# Patient Record
Sex: FEMALE | Race: WHITE | URBAN - METROPOLITAN AREA
[De-identification: names, ages, dates, MRNs, and addresses within clinical notes are randomized per-mention and may not be internally consistent; named-entity substitution may affect disease eponyms.]

---

## 2022-10-06 ENCOUNTER — OFFICE VISIT (OUTPATIENT)
Dept: OBGYN CLINIC | Age: 62
End: 2022-10-06
Payer: COMMERCIAL

## 2022-10-06 VITALS
BODY MASS INDEX: 30.05 KG/M2 | DIASTOLIC BLOOD PRESSURE: 82 MMHG | HEIGHT: 64 IN | SYSTOLIC BLOOD PRESSURE: 131 MMHG | WEIGHT: 176 LBS

## 2022-10-06 DIAGNOSIS — Z01.419 WELL WOMAN EXAM: Primary | ICD-10-CM

## 2022-10-06 DIAGNOSIS — Z13.89 SCREENING FOR GENITOURINARY CONDITION: ICD-10-CM

## 2022-10-06 LAB
BILIRUBIN, URINE, POC: NEGATIVE
BLOOD URINE, POC: NEGATIVE
GLUCOSE URINE, POC: NEGATIVE
KETONES, URINE, POC: NEGATIVE
LEUKOCYTE ESTERASE, URINE, POC: NEGATIVE
NITRITE, URINE, POC: NEGATIVE
PH, URINE, POC: 9 (ref 4.6–8)
PROTEIN,URINE, POC: NEGATIVE
SPECIFIC GRAVITY, URINE, POC: 1.02 (ref 1–1.03)
URINALYSIS CLARITY, POC: CLEAR
URINALYSIS COLOR, POC: YELLOW
UROBILINOGEN, POC: ABNORMAL

## 2022-10-06 PROCEDURE — 81002 URINALYSIS NONAUTO W/O SCOPE: CPT | Performed by: OBSTETRICS & GYNECOLOGY

## 2022-10-06 PROCEDURE — 99396 PREV VISIT EST AGE 40-64: CPT | Performed by: OBSTETRICS & GYNECOLOGY

## 2022-10-06 NOTE — PROGRESS NOTES
10/6/2022    Clyde Jhaveri  1960  Age: 60 y.o. No LMP recorded (lmp unknown). Patient is postmenopausal.    No obstetric history on file. PCP: Amrik Bautista MD  Patient doessee them for regular preventative visits. HPI: No gyn concerns. No flowsheet data found. Allergies, medications, past medical and surgical history, social history, family history all reviewed. Review of Systems  Constitutional:  Denies unexplained weight loss or heat or cold intolerance  ENT: Denies change in vision, change in hearing, frequent headaches  Cardiovascular:  Denies chest pain, swelling in legs or feet, shortness of breath when lying flat  Respiratory:  Denies shortness of breath, cough greater than 2 weeks or coughing up blood  Gastro: Denies diarrhea greater than 2 weeks, rectal bleeding, bloody stools, heartburn, or constipation  :  Denies blood in urine, nocturia, dysuria or incontinence  Breast:  Denies nipple discharge, masses or pain  Skin:  Denies rash greater than 2 weeks, change in moles  Musculoskeletal/Neuro:  Denies joint pain, muscle weakness, seizures, loss of balance or frequent falls  Psych:  Denies frequent crying spells or severe anxiety  Heme:  Denies easy bruising, bleeding gums, frequent nosebleeds or swollen lymph nodes  GYN:  Denies bleeding or spotting, pain with sex, see HPI  Vitals:    10/06/22 1335   BP: 131/82   Weight: 176 lb (79.8 kg)   Height: 5' 4\" (1.626 m)       Body mass index is 30.21 kg/m². Pt in no distress. Alert and oriented x3. Affect bright. Well developed, well nourished. HEENT: normocephalic, atraumatic. Sclerae nonicteric. Neck supple w/o thyromegaly. Trachea midline. Heart: regular rate and rhythm, no murmur or gallop  Lungs: clear to auscultation.  Respiratory effort normal.  Breasts: no masses or axillary lymphadenopathy  Abdomen: soft and nontender, no masses, no organomegaly, no ventral hernia  Pelvic: normal external genitalia, urethral meatus, urethra, vagina and cervix. Bimanual exam w/o masses or tenderness. Bladder nontender. Uterus normal size. Adnexae normal.  Perineum and anus normal. No hemorrhoids. There is no problem list on file for this patient. Orders Placed This Encounter   Procedures    AMB POC URINALYSIS DIP STICK MANUAL W/O MICRO          Pt counseled about routine screening and vaccination recommendations.       Roger Pruitt MD

## 2023-11-14 ENCOUNTER — OFFICE VISIT (OUTPATIENT)
Dept: OBGYN CLINIC | Age: 63
End: 2023-11-14
Payer: COMMERCIAL

## 2023-11-14 VITALS
SYSTOLIC BLOOD PRESSURE: 124 MMHG | HEART RATE: 82 BPM | BODY MASS INDEX: 30.05 KG/M2 | DIASTOLIC BLOOD PRESSURE: 84 MMHG | WEIGHT: 176 LBS | OXYGEN SATURATION: 99 % | HEIGHT: 64 IN

## 2023-11-14 DIAGNOSIS — Z13.89 SCREENING FOR GENITOURINARY CONDITION: ICD-10-CM

## 2023-11-14 DIAGNOSIS — Z01.419 WELL WOMAN EXAM: Primary | ICD-10-CM

## 2023-11-14 LAB
BILIRUBIN, URINE, POC: NEGATIVE
BLOOD URINE, POC: NEGATIVE
GLUCOSE URINE, POC: NEGATIVE
KETONES, URINE, POC: NEGATIVE
LEUKOCYTE ESTERASE, URINE, POC: NEGATIVE
NITRITE, URINE, POC: NEGATIVE
PH, URINE, POC: 6 (ref 4.6–8)
PROTEIN,URINE, POC: NEGATIVE
SPECIFIC GRAVITY, URINE, POC: 1.03 (ref 1–1.03)
URINALYSIS CLARITY, POC: CLEAR
URINALYSIS COLOR, POC: YELLOW
UROBILINOGEN, POC: NORMAL

## 2023-11-14 PROCEDURE — 81002 URINALYSIS NONAUTO W/O SCOPE: CPT | Performed by: OBSTETRICS & GYNECOLOGY

## 2023-11-14 PROCEDURE — 99396 PREV VISIT EST AGE 40-64: CPT | Performed by: OBSTETRICS & GYNECOLOGY

## 2023-11-14 RX ORDER — MULTIVIT-MIN/IRON/FOLIC ACID/K 18-600-40
CAPSULE ORAL
COMMUNITY

## 2023-11-14 SDOH — ECONOMIC STABILITY: HOUSING INSECURITY
IN THE LAST 12 MONTHS, WAS THERE A TIME WHEN YOU DID NOT HAVE A STEADY PLACE TO SLEEP OR SLEPT IN A SHELTER (INCLUDING NOW)?: NO

## 2023-11-14 SDOH — ECONOMIC STABILITY: INCOME INSECURITY: HOW HARD IS IT FOR YOU TO PAY FOR THE VERY BASICS LIKE FOOD, HOUSING, MEDICAL CARE, AND HEATING?: NOT HARD AT ALL

## 2023-11-14 SDOH — ECONOMIC STABILITY: FOOD INSECURITY: WITHIN THE PAST 12 MONTHS, YOU WORRIED THAT YOUR FOOD WOULD RUN OUT BEFORE YOU GOT MONEY TO BUY MORE.: NEVER TRUE

## 2023-11-14 SDOH — ECONOMIC STABILITY: FOOD INSECURITY: WITHIN THE PAST 12 MONTHS, THE FOOD YOU BOUGHT JUST DIDN'T LAST AND YOU DIDN'T HAVE MONEY TO GET MORE.: NEVER TRUE

## 2023-11-14 ASSESSMENT — PATIENT HEALTH QUESTIONNAIRE - PHQ9
1. LITTLE INTEREST OR PLEASURE IN DOING THINGS: 0
SUM OF ALL RESPONSES TO PHQ QUESTIONS 1-9: 0
SUM OF ALL RESPONSES TO PHQ QUESTIONS 1-9: 0
SUM OF ALL RESPONSES TO PHQ9 QUESTIONS 1 & 2: 0
SUM OF ALL RESPONSES TO PHQ QUESTIONS 1-9: 0
SUM OF ALL RESPONSES TO PHQ QUESTIONS 1-9: 0
2. FEELING DOWN, DEPRESSED OR HOPELESS: 0

## 2023-11-19 LAB
COLLECTION METHOD: NORMAL
CYTOLOGIST CVX/VAG CYTO: NORMAL
CYTOLOGY CVX/VAG DOC THIN PREP: NORMAL
HPV APTIMA: NEGATIVE
Lab: NORMAL
PAP SOURCE: NORMAL
PATH REPORT.FINAL DX SPEC: NORMAL
STAT OF ADQ CVX/VAG CYTO-IMP: NORMAL

## 2024-12-04 ENCOUNTER — OFFICE VISIT (OUTPATIENT)
Dept: OBGYN CLINIC | Age: 64
End: 2024-12-04
Payer: COMMERCIAL

## 2024-12-04 VITALS
BODY MASS INDEX: 30.35 KG/M2 | HEIGHT: 64 IN | DIASTOLIC BLOOD PRESSURE: 84 MMHG | SYSTOLIC BLOOD PRESSURE: 136 MMHG | WEIGHT: 177.8 LBS

## 2024-12-04 DIAGNOSIS — Z13.89 SCREENING FOR GENITOURINARY CONDITION: ICD-10-CM

## 2024-12-04 DIAGNOSIS — Z01.419 WELL WOMAN EXAM: Primary | ICD-10-CM

## 2024-12-04 LAB
BILIRUBIN, URINE, POC: NEGATIVE
BLOOD URINE, POC: NEGATIVE
GLUCOSE URINE, POC: NEGATIVE
KETONES, URINE, POC: NEGATIVE
LEUKOCYTE ESTERASE, URINE, POC: NEGATIVE
NITRITE, URINE, POC: NEGATIVE
PH, URINE, POC: 6 (ref 4.6–8)
PROTEIN,URINE, POC: NEGATIVE
SPECIFIC GRAVITY, URINE, POC: 1.02 (ref 1–1.03)
URINALYSIS CLARITY, POC: CLEAR
URINALYSIS COLOR, POC: YELLOW
UROBILINOGEN, POC: NORMAL MG/DL

## 2024-12-04 PROCEDURE — 99397 PER PM REEVAL EST PAT 65+ YR: CPT | Performed by: OBSTETRICS & GYNECOLOGY

## 2024-12-04 PROCEDURE — 81002 URINALYSIS NONAUTO W/O SCOPE: CPT | Performed by: OBSTETRICS & GYNECOLOGY

## 2024-12-04 ASSESSMENT — PATIENT HEALTH QUESTIONNAIRE - PHQ9
SUM OF ALL RESPONSES TO PHQ QUESTIONS 1-9: 0
SUM OF ALL RESPONSES TO PHQ QUESTIONS 1-9: 0
1. LITTLE INTEREST OR PLEASURE IN DOING THINGS: NOT AT ALL
SUM OF ALL RESPONSES TO PHQ QUESTIONS 1-9: 0
2. FEELING DOWN, DEPRESSED OR HOPELESS: NOT AT ALL
SUM OF ALL RESPONSES TO PHQ QUESTIONS 1-9: 0
SUM OF ALL RESPONSES TO PHQ9 QUESTIONS 1 & 2: 0

## 2024-12-04 NOTE — PROGRESS NOTES
2024    Arabella Montoya  1960  Age: 64 y.o.    No LMP recorded (lmp unknown). Patient is postmenopausal.      PCP: Zach Coleman MD  Patient does see them for regular preventative visits.    HPI: NO gyn concerns.        Failed to redirect to the Timeline version of the [employer] SmartLink.    Allergies, medications, past medical and surgical history, social history, family history all reviewed.       Review of Systems  Constitutional:  Denies unexplained weight loss or heat or cold intolerance  ENT: Denies change in vision, change in hearing, frequent headaches  Cardiovascular:  Denies chest pain, swelling in legs or feet, shortness of breath when lying flat  Respiratory:  Denies shortness of breath, cough greater than 2 weeks or coughing up blood  Gastro: Denies diarrhea greater than 2 weeks, rectal bleeding, bloody stools, heartburn, or constipation  :  Denies blood in urine, nocturia, dysuria or incontinence  Breast:  Denies nipple discharge, masses or pain  Skin:  Denies rash greater than 2 weeks, change in moles  Musculoskeletal/Neuro:  Denies joint pain, muscle weakness, seizures, loss of balance or frequent falls  Psych:  Denies frequent crying spells or severe anxiety  Heme:  Denies easy bruising, bleeding gums, frequent nosebleeds or swollen lymph nodes  GYN:  Denies bleeding or spotting. See HPI.    Vitals:    24 1403   BP: 136/84   Site: Left Upper Arm   Position: Sitting   Weight: 80.6 kg (177 lb 12.8 oz)   Height: 1.626 m (5' 4\")       Body mass index is 30.52 kg/m².    Pt in no distress. Alert and oriented x3. Affect bright.  Well developed, well nourished.  HEENT: normocephalic, atraumatic. Sclerae nonicteric.  Neck supple w/o thyromegaly. Trachea midline.  Heart: regular rate and rhythm, no murmur or gallop  Lungs: clear to auscultation. Respiratory effort normal.  Breasts: no masses or axillary lymphadenopathy  Abdomen: soft and nontender, no masses, no organomegaly, no